# Patient Record
Sex: FEMALE | Race: WHITE | Employment: OTHER | URBAN - METROPOLITAN AREA
[De-identification: names, ages, dates, MRNs, and addresses within clinical notes are randomized per-mention and may not be internally consistent; named-entity substitution may affect disease eponyms.]

---

## 2017-05-17 NOTE — TELEPHONE ENCOUNTER
Refill is per verbal order of Dr. Virginia Bradley.    Requested Prescriptions     Pending Prescriptions Disp Refills    fenofibric acid (TRILIPIX) 135 mg capsule 30 Cap 1     Sig: Take 1 Cap by mouth daily.

## 2017-05-18 RX ORDER — FENOFIBRIC ACID 135 MG/1
135 CAPSULE, DELAYED RELEASE ORAL DAILY
Qty: 30 CAP | Refills: 1 | Status: SHIPPED | OUTPATIENT
Start: 2017-05-18 | End: 2017-07-03 | Stop reason: SDUPTHER

## 2017-05-23 ENCOUNTER — TELEPHONE (OUTPATIENT)
Dept: CARDIOLOGY CLINIC | Age: 77
End: 2017-05-23

## 2017-05-23 DIAGNOSIS — E78.00 ELEVATED CHOLESTEROL: Primary | ICD-10-CM

## 2017-06-28 ENCOUNTER — OFFICE VISIT (OUTPATIENT)
Dept: CARDIOLOGY CLINIC | Age: 77
End: 2017-06-28

## 2017-06-28 VITALS
DIASTOLIC BLOOD PRESSURE: 50 MMHG | SYSTOLIC BLOOD PRESSURE: 140 MMHG | BODY MASS INDEX: 30.39 KG/M2 | HEIGHT: 64 IN | HEART RATE: 76 BPM | WEIGHT: 178 LBS

## 2017-06-28 DIAGNOSIS — E78.00 HYPERCHOLESTEROLEMIA: ICD-10-CM

## 2017-06-28 DIAGNOSIS — E78.00 ELEVATED CHOLESTEROL: Primary | ICD-10-CM

## 2017-06-28 DIAGNOSIS — I48.0 PAROXYSMAL ATRIAL FIBRILLATION (HCC): ICD-10-CM

## 2017-06-28 DIAGNOSIS — I10 ESSENTIAL HYPERTENSION: ICD-10-CM

## 2017-06-28 RX ORDER — CARBIDOPA AND LEVODOPA 25; 100 MG/1; MG/1
1 TABLET, EXTENDED RELEASE ORAL 3 TIMES DAILY
COMMUNITY

## 2017-06-28 RX ORDER — DOCUSATE SODIUM 100 MG/1
100 CAPSULE, LIQUID FILLED ORAL 2 TIMES DAILY
COMMUNITY

## 2017-06-28 NOTE — PROGRESS NOTES
Visit Vitals    /50    Pulse 76    Ht 5' 4\" (1.626 m)    Wt 178 lb (80.7 kg)    BMI 30.55 kg/m2     Medication changes for this OV VO per Dr Rogelio Dobbs

## 2017-06-28 NOTE — MR AVS SNAPSHOT
Visit Information Date & Time Provider Department Dept. Phone Encounter #  
 6/28/2017  1:20 PM Jovani Gupta MD [facility] Jazzmine Singh [phone] [id number] Follow-up Instructions Return in about 6 months (around 12/28/2017). Your Appointments 12/22/2017  1:00 PM  
ESTABLISHED PATIENT with Jovani Gupta MD  
[facility] (NICKIE SCHEDULING) Appt Note: 6 mo [address] [address] [address]  
[phone]  
  
   
 [address] [address] [address] Upcoming Health Maintenance Date Due DTaP/Tdap/Td series (1 - Tdap) 8/13/1961 ZOSTER VACCINE AGE 60> 8/13/2000 GLAUCOMA SCREENING Q2Y 8/13/2005 OSTEOPOROSIS SCREENING (DEXA) 8/13/2005 Pneumococcal 65+ Low/Medium Risk (1 of 2 - PCV13) 8/13/2005 MEDICARE YEARLY EXAM 8/13/2005 INFLUENZA AGE 9 TO ADULT 8/1/2017 Allergies as of 6/28/2017  Review Complete On: 6/28/2017 By: oJvani Gupta MD  
  
 Severity Noted Reaction Type Reactions Demerol [Meperidine]  11/09/2010    Nausea and Vomiting Heparinoids  03/19/2014    Other (comments) Has a +Hit Ab. Ivp Dye [Fd And C Blue No.1]  11/09/2010    Rash Percocet [Oxycodone-acetaminophen]  03/11/2014    Nausea Only Sulfa (Sulfonamide Antibiotics)  11/09/2010    Rash Current Immunizations  Never Reviewed No immunizations on file. Not reviewed this visit You Were Diagnosed With   
  
 Codes Comments Elevated cholesterol    -  Primary ICD-10-CM: E78.00 ICD-9-CM: 272.0 Paroxysmal atrial fibrillation (HCC)     ICD-10-CM: I48.0 ICD-9-CM: 427.31 Hypercholesterolemia     ICD-10-CM: E78.00 ICD-9-CM: 272.0 Essential hypertension     ICD-10-CM: I10 
ICD-9-CM: 401.9 Vitals BP Pulse Height(growth percentile) Weight(growth percentile) BMI OB Status 140/50 76 5' 4\" (1.626 m) 178 lb (80.7 kg) 30.55 kg/m2 Postmenopausal  
 Smoking Status Never Smoker Vitals History BMI and BSA Data Body Mass Index Body Surface Area 30.55 kg/m 2 1.91 m 2 Preferred Pharmacy Pharmacy Name Phone [facility] [address] [facility],  Kira Lemus [address] [place] [address] [phone] Your Updated Medication List  
  
   
This list is accurate as of: 6/28/17  1:43 PM.  Always use your most recent med list.  
  
  
  
  
 aspirin delayed-release 81 mg tablet Take  by mouth daily. baclofen 10 mg tablet Commonly known as:  LIORESAL Take 1 Tab by mouth every eight (8) hours as needed (spasms). COLACE 100 mg capsule Generic drug:  docusate sodium Take 100 mg by mouth two (2) times a day. DULoxetine 60 mg capsule Commonly known as:  CYMBALTA Take 60 mg by mouth daily. fenofibric acid 135 mg capsule Commonly known as:  TRILIPIX Take 1 Cap by mouth daily. furosemide 40 mg tablet Commonly known as:  LASIX Takes 1/2 tablet twice daily  
  
 gabapentin 300 mg capsule Commonly known as:  NEURONTIN Take 300 mg by mouth two (2) times daily as needed. HYDROcodone-acetaminophen 5-325 mg per tablet Commonly known as:  Milinda Copier Take 0.5-1 Tabs by mouth every six (6) hours as needed for Pain.  
  
 metFORMIN 500 mg tablet Commonly known as:  GLUCOPHAGE Take  by mouth two (2) times daily (with meals). NUPLAZID 17 mg Tab Generic drug:  pimavanserin Take  by mouth. oxybutynin 5 mg tablet Commonly known as:  MFADRIES Take 5 mg by mouth daily as needed. PriLOSEC 40 mg capsule Generic drug:  omeprazole Take 40 mg by mouth daily. PROBIOTIC PO Take  by mouth three (3) times daily as needed. Senna 8.6 mg Cap Generic drug:  sennosides Take  by mouth two (2) times daily as needed. SINEMET CR  mg per tablet Generic drug:  carbidopa-levodopa CR Take 1 Tab by mouth two (2) times a day. TOPROL XL 25 mg XL tablet Generic drug:  metoprolol succinate Take  by mouth daily. Follow-up Instructions Return in about 6 months (around 12/28/2017). Introducing [facility] SERVICES! Dear Shilpi Vaca: Thank you for requesting a ICVRx account. Our records indicate that you already have an active ICVRx account. You can access your account anytime at [email or web address] Did you know that you can access your hospital and ER discharge instructions at any time in ICVRx? You can also review all of your test results from your hospital stay or ER visit. Additional Information If you have questions, please visit the Frequently Asked Questions section of the ICVRx website at [email or web address] Remember, ICVRx is NOT to be used for urgent needs. For medical emergencies, dial 911. Now available from your iPhone and Android! Please provide this summary of care documentation to your next provider. Your primary care clinician is listed as Abi Coy III. If you have any questions after today's visit, please call [phone].

## 2017-06-28 NOTE — PROGRESS NOTES
LAST OFFICE VISIT : 12/27/2016        ICD-10-CM ICD-9-CM   1. Elevated cholesterol E78.00 272.0   2. Paroxysmal atrial fibrillation (HCC) I48.0 427.31   3. Hypercholesterolemia E78.00 272.0   4. Essential hypertension I10 401.9            Steph Dc is a 68 y.o. female with HTN, dyslipidemia referred for routine 6 month follow up. Cardiac risk factors: dyslipidemia, hypertension, post-menopausal  I have personally obtained the history from the patient. [facility] [place]      Mrs. Aiken is doing well with no interval issues. She has chronic BARRIOS with moderate activity. Recently suffered from pneumonia few months ago. Does not report aspiration. Daughter notes that she \"chokes\" on her food. Follows pulmonology for this. She leads a fairly sedentary lifestyle, but pursues in home therapy with assistance of an aid. She ambulates with a wheelchair. The patient denies chest pain/ shortness of breath, orthopnea, PND, LE edema, palpitations, syncope, presyncope or fatigue. Of note, she is accompanied by daughter and .       ACTIVE PROBLEM LIST     Patient Active Problem List    Diagnosis Date Noted    Paroxysmal atrial fibrillation ([facility] [address] [facility] 12/09/2015    Bilateral hip pain 03/21/2014    Arm swelling 03/21/2014    Oliguria 03/21/2014    Fever, unspecified 03/21/2014    Thrombocytopenia, unspecified ([facility] 03/21/2014    HIT (heparin-induced thrombocytopenia) ([facility] 03/21/2014    HTN (hypertension) 17/76/6302    Complicated UTI (urinary tract infection) 03/21/2014    Depression 03/21/2014    Debility 93/20/2582    Metabolic encephalopathy 07/83/8909    Abscess of suprapubic region 03/14/2014    Hip pain 03/11/2014    Unspecified essential hypertension 03/11/2014    Chest discomfort     Hypercholesterolemia            PAST MEDICAL HISTORY     Past Medical History:   Diagnosis Date    Blockage of coronary artery of heart (HCC)     Breast cancer ([facility]     Chest discomfort     Fetal alcohol syndrome     Hernia     Hip pain, chronic     Hx of nephrostomy     Hypercholesterolemia     Hypertension            PAST SURGICAL HISTORY     Past Surgical History:   Procedure Laterality Date    HX CHOLECYSTECTOMY      HX ORTHOPAEDIC Right     BKA    VASCULAR SURGERY PROCEDURE UNLIST Right     right leg vascular procedure          ALLERGIES     Allergies   Allergen Reactions    Demerol [Meperidine] Nausea and Vomiting    Heparinoids Other (comments)     Has a +Hit Ab.  Ivp Dye [Fd And C Blue No.1] Rash    Percocet [Oxycodone-Acetaminophen] Nausea Only    Sulfa (Sulfonamide Antibiotics) Rash          FAMILY HISTORY     No family history on file. negative for cardiac disease       SOCIAL HISTORY     Social History     Social History    Marital status:      Spouse name: N/A    Number of children: N/A    Years of education: N/A     Social History Main Topics    Smoking status: Never Smoker    Smokeless tobacco: Never Used    Alcohol use No    Drug use: None    Sexual activity: Not Asked     Other Topics Concern    None     Social History Narrative         MEDICATIONS     Current Outpatient Prescriptions   Medication Sig    docusate sodium (COLACE) 100 mg capsule Take 100 mg by mouth two (2) times a day.  pimavanserin (NUPLAZID) 17 mg tab Take  by mouth.  carbidopa-levodopa CR (SINEMET CR)  mg per tablet Take 1 Tab by mouth two (2) times a day.  fenofibric acid (TRILIPIX) 135 mg capsule Take 1 Cap by mouth daily.  furosemide (LASIX) 40 mg tablet Takes 1/2 tablet twice daily    sennosides (SENNA) 8.6 mg cap Take  by mouth two (2) times daily as needed.  LACTOBACILLUS ACIDOPHILUS (PROBIOTIC PO) Take  by mouth three (3) times daily as needed.  omeprazole (PRILOSEC) 40 mg capsule Take 40 mg by mouth daily.  aspirin delayed-release 81 mg tablet Take  by mouth daily.     metoprolol succinate (TOPROL XL) 25 mg XL tablet Take  by mouth daily.  metFORMIN (GLUCOPHAGE) 500 mg tablet Take  by mouth two (2) times daily (with meals).  baclofen (LIORESAL) 10 mg tablet Take 1 Tab by mouth every eight (8) hours as needed (spasms).  HYDROcodone-acetaminophen (NORCO) 5-325 mg per tablet Take 0.5-1 Tabs by mouth every six (6) hours as needed for Pain.  gabapentin (NEURONTIN) 300 mg capsule Take 300 mg by mouth two (2) times daily as needed.  oxybutynin (DITROPAN) 5 mg tablet Take 5 mg by mouth daily as needed.  DULoxetine (CYMBALTA) 60 mg capsule Take 60 mg by mouth daily. No current facility-administered medications for this visit. I have reviewed the nurses notes, vitals, problem list, allergy list, medical history, family, social history and medications. REVIEW OF SYMPTOMS      General: Pt denies excessive weight gain or loss. Pt is able to conduct ADL's  HEENT: Denies blurred vision, headaches, hearing loss, epistaxis and difficulty swallowing. Respiratory: Denies cough, congestion, shortness of breath, BARRIOS, wheezing or stridor. Cardiovascular: Denies precordial pain, palpitations, edema or PND  Gastrointestinal: Denies poor appetite, indigestion, abdominal pain or blood in stool  Genitourinary: Denies hematuria, dysuria, increased urinary frequency  Musculoskeletal: Denies swelling from muscles or joints. Positive for joint pain. Neurologic: Denies tremor, paresthesias, headache, or sensory motor disturbance  Psychiatric: Denies confusion, insomnia, depression  Integumentray: Denies rash, itching or ulcers. Hematologic: Denies easy bruising, bleeding     PHYSICAL EXAMINATION      Vitals:    06/28/17 1321   BP: 140/50   Pulse: 76   Weight: 178 lb (80.7 kg)   Height: 5' 4\" (1.626 m)     General: Well developed, in no acute distress. HEENT: No jaundice, oral mucosa moist, no oral ulcers  Neck: Supple, no stiffness, no lymphadenopathy, supple  Heart:  Normal S1/S2 negative S3 or S4.  Regular, no murmur, gallop or rub, no jugular venous distention  Respiratory: Clear bilaterally x 4, no wheezing or rales  Extremities:  Right leg amputation  Musculoskeletal: No clubbing, no deformities  Neuro: A&Ox3, speech clear, gait stable, cooperative, no focal neurologic deficits  Skin: Skin color is normal. No rashes or lesions. Non diaphoretic, moist.       DIAGNOSTIC DATA     1. Lipids  6/3/16 - , HDL 29, LDL 85,   5/9/17- , HDL 29, LDL 52,     2. Echocardiogram   5/12/09 - EF 55-60%, Mild TR, small pericardial effusion, RVSP 34 mmHg  1/28/13 - LVEF 60%, no WMA, grade 2 DD, mild LAE, MAC, mild MR, mild to moderate TR, RA 10 mmHg, PA 44mmHg  10/14 - Normal Echo    3. Myocardial perfusion study   5/2/09 - Normal perfusion, EF 77%  10/10/14 - Normal stress test at [facility]    4. Carotid Duplex  7/21/11 - 10-49% bilaterally  6/18/81 - DARYL 18-22%, LICA 28-61%  87/7/02 - DARYL 51-94%, LICA 21-71%  6/85/17 - DARYL 56-49%, LICA 68-40%  88/52/72-BZHD 44-46%, LICA 87-41%           LABORATORY DATA            Lab Results   Component Value Date/Time    WBC 3.7 03/20/2014 04:50 AM    Hemoglobin (POC) 11.9 03/11/2014 09:03 PM    HGB 9.0 03/20/2014 04:50 AM    Hematocrit (POC) 35 03/11/2014 09:03 PM    HCT 28.4 03/20/2014 04:50 AM    PLATELET 292 85/91/0982 04:50 AM    MCV 91.3 03/20/2014 04:50 AM      Lab Results   Component Value Date/Time    Sodium 140 03/20/2014 04:50 AM    Potassium 3.8 03/20/2014 04:50 AM    Chloride 101 03/20/2014 04:50 AM    CO2 27 03/20/2014 04:50 AM    Anion gap 12 03/20/2014 04:50 AM    Glucose 115 03/20/2014 04:50 AM    BUN 16 03/20/2014 04:50 AM    Creatinine 0.73 03/20/2014 04:50 AM    BUN/Creatinine ratio 22 03/20/2014 04:50 AM    GFR est AA >60 03/20/2014 04:50 AM    GFR est non-AA >60 03/20/2014 04:50 AM    Calcium 8.0 03/20/2014 04:50 AM    Bilirubin, total 0.6 05/09/2017 10:38 AM    AST (SGOT) 16 05/09/2017 10:38 AM    Alk.  phosphatase 38 05/09/2017 10:38 AM    Protein, total 6.8 05/09/2017 10:38 AM    Albumin 3.7 05/09/2017 10:38 AM    ALT (SGPT) 8 05/09/2017 10:38 AM           ASSESSMENT/RECOMMENDATIONS:.      1. HTN  -BP is under good control on current medical regimen. No adjustments to antihypertensives. 2. Dyslipidemia  -LDL is at goal however HDL is low and most likely related to inactivity    3. Right carotid bruit  -not audible, and have been stable  -will recheck in 6 months    4. PAF  - No recurrent AF spells. No complaints of irregularity in rhythm  -Family understands the risk of not being anticoagualted. -Continue with ASA daily. 5. Return in 6 months or PRN    Orders Placed This Encounter    docusate sodium (COLACE) 100 mg capsule     Sig: Take 100 mg by mouth two (2) times a day.  pimavanserin (NUPLAZID) 17 mg tab     Sig: Take  by mouth.  carbidopa-levodopa CR (SINEMET CR)  mg per tablet     Sig: Take 1 Tab by mouth two (2) times a day. Follow-up Disposition:  Return in about 6 months (around 12/28/2017). I have discussed the diagnosis with  Mariah Hightower and the intended plan as seen in the above orders. Questions were answered concerning future plans. I have discussed medication side effects and warnings with the patient as well. Thank you,  Reji Purcell MD for involving me in the care of  Mariah Hightower. Please do not hesitate to contact me for further questions/concerns. Written by Momo Gleason, as dictated by Isamar Rasmussen MD.    Darrell Pemberton MD, [facility] [address]  [address]      [facility], [address] [place] [address] [address]      [phone] / [phone] Fax

## 2017-09-11 ENCOUNTER — TELEPHONE (OUTPATIENT)
Dept: CARDIOLOGY CLINIC | Age: 77
End: 2017-09-11

## 2017-09-11 NOTE — TELEPHONE ENCOUNTER
Patient's  states that the patient was recently in [facility] and would like to discuss her medication changes. Can be reached at [phone]. Thanks!

## 2017-09-12 NOTE — TELEPHONE ENCOUNTER
Need to pull records from [address] [place] [address]. - Stroke. Need to find out if pt can restart blood thinner.

## 2017-09-21 NOTE — TELEPHONE ENCOUNTER
[address] [place] [address] stay for you to review. Pt  wants to know if she can restart a blood thinner due to her having a poss. Stroke. Had stopped previously due to seeing blood in her urostomy bag per . Xarelto was rx prior.

## 2017-09-22 NOTE — TELEPHONE ENCOUNTER
Lft msg for pt . Dr. Fiona Vance wants him to check with urologist / neuro before going back on Xarelto.  He is fine with it cardiac wise but want to verify with other specialist.

## 2017-09-22 NOTE — TELEPHONE ENCOUNTER
Patient Acosta Dodd MD   You 23 hours ago (4:12 PM)                 pleawse ask them to ask neurologist if ok.  The one they saw at [place] [id number] (Routing comment)

## 2017-12-14 ENCOUNTER — HOSPITAL ENCOUNTER (OUTPATIENT)
Dept: LAB | Age: 77
Discharge: HOME | End: 2017-12-14
Payer: MEDICARE

## 2017-12-14 PROCEDURE — 80061 LIPID PANEL: CPT

## 2017-12-14 PROCEDURE — 36415 COLL VENOUS BLD VENIPUNCTURE: CPT

## 2017-12-15 LAB
CHOLEST SERPL-MCNC: 152 MG/DL (ref 100–199)
HDLC SERPL-MCNC: 36 MG/DL
INTERPRETATION, 910389: NORMAL
LDLC SERPL CALC-MCNC: 77 MG/DL (ref 0–99)
TRIGL SERPL-MCNC: 196 MG/DL (ref 0–149)
VLDLC SERPL CALC-MCNC: 39 MG/DL (ref 5–40)

## 2017-12-18 DIAGNOSIS — E78.00 HYPERCHOLESTEREMIA: Primary | ICD-10-CM

## 2017-12-22 ENCOUNTER — OFFICE VISIT (OUTPATIENT)
Dept: CARDIOLOGY CLINIC | Age: 77
End: 2017-12-22

## 2017-12-22 VITALS — HEART RATE: 68 BPM | DIASTOLIC BLOOD PRESSURE: 50 MMHG | SYSTOLIC BLOOD PRESSURE: 130 MMHG | OXYGEN SATURATION: 97 %

## 2017-12-22 DIAGNOSIS — I48.0 PAROXYSMAL ATRIAL FIBRILLATION (HCC): ICD-10-CM

## 2017-12-22 DIAGNOSIS — I10 ESSENTIAL HYPERTENSION: ICD-10-CM

## 2017-12-22 DIAGNOSIS — E78.00 ELEVATED CHOLESTEROL: Primary | ICD-10-CM

## 2017-12-22 PROBLEM — F33.9 RECURRENT DEPRESSION (HCC): Status: ACTIVE | Noted: 2017-12-22

## 2017-12-22 RX ORDER — RIVASTIGMINE 4.6 MG/24H
1 PATCH, EXTENDED RELEASE TRANSDERMAL DAILY
COMMUNITY

## 2017-12-22 NOTE — MR AVS SNAPSHOT
Visit Information Date & Time Provider Department Dept. Phone Encounter #  
 12/22/2017  1:00 PM Juana Barraza MD [facility] Dea Ruby [phone] [id number] Upcoming Health Maintenance Date Due DTaP/Tdap/Td series (1 - Tdap) 8/13/1961 ZOSTER VACCINE AGE 60> 6/13/2000 GLAUCOMA SCREENING Q2Y 8/13/2005 OSTEOPOROSIS SCREENING (DEXA) 8/13/2005 Pneumococcal 65+ Low/Medium Risk (1 of 2 - PCV13) 8/13/2005 MEDICARE YEARLY EXAM 8/13/2005 Influenza Age 5 to Adult 8/1/2017 Allergies as of 12/22/2017  Review Complete On: 12/22/2017 By: Juana Barraza MD  
  
 Severity Noted Reaction Type Reactions Demerol [Meperidine]  11/09/2010    Nausea and Vomiting Heparinoids  03/19/2014    Other (comments) Has a +Hit Ab. Ivp Dye [Fd And C Blue No.1]  11/09/2010    Rash Percocet [Oxycodone-acetaminophen]  03/11/2014    Nausea Only Sulfa (Sulfonamide Antibiotics)  11/09/2010    Rash Current Immunizations  Never Reviewed No immunizations on file. Not reviewed this visit Vitals BP Pulse SpO2 OB Status Smoking Status 130/50 (BP 1 Location: Right arm, BP Patient Position: Sitting) 68 97% Postmenopausal Never Smoker Vitals History Preferred Pharmacy Pharmacy Name Phone [facility] [address] [place] [address] [phone] Your Updated Medication List  
  
   
This list is accurate as of: 12/22/17  1:19 PM.  Always use your most recent med list.  
  
  
  
  
 aspirin delayed-release 81 mg tablet Take  by mouth daily. baclofen 10 mg tablet Commonly known as:  LIORESAL Take 1 Tab by mouth every eight (8) hours as needed (spasms). COLACE 100 mg capsule Generic drug:  docusate sodium Take 100 mg by mouth two (2) times a day. DULoxetine 60 mg capsule Commonly known as:  CYMBALTA Take 60 mg by mouth daily. fenofibric acid 135 mg capsule Commonly known as:  TRILIPIX Take 1 Cap by mouth daily. furosemide 40 mg tablet Commonly known as:  LASIX Takes 1/2 tablet twice daily  
  
 gabapentin 300 mg capsule Commonly known as:  NEURONTIN Take 300 mg by mouth two (2) times daily as needed. HYDROcodone-acetaminophen 5-325 mg per tablet Commonly known as:  Barnet Cuff Take 0.5-1 Tabs by mouth every six (6) hours as needed for Pain.  
  
 metFORMIN 500 mg tablet Commonly known as:  GLUCOPHAGE Take  by mouth two (2) times daily (with meals). NUPLAZID 17 mg Tab Generic drug:  pimavanserin Take 17 mg by mouth nightly. oxybutynin 5 mg tablet Commonly known as:  DLGWSHQB Take 5 mg by mouth daily as needed. PriLOSEC 40 mg capsule Generic drug:  omeprazole Take 40 mg by mouth daily. PROBIOTIC PO Take  by mouth three (3) times daily as needed. rivastigmine 4.6 mg/24 hr patch Commonly known as:  EXELON  
1 Patch by TransDERmal route daily. Senna 8.6 mg Cap Generic drug:  sennosides Take  by mouth two (2) times daily as needed. SINEMET CR  mg per tablet Generic drug:  carbidopa-levodopa ER Take 1 Tab by mouth three (3) times daily. TOPROL XL 25 mg XL tablet Generic drug:  metoprolol succinate Take  by mouth daily. Introducing [facility]! Dear Tc Florez: Thank you for requesting a MePlease account. Our records indicate that you already have an active MePlease account. You can access your account anytime at [email or web address] Did you know that you can access your hospital and ER discharge instructions at any time in MePlease? You can also review all of your test results from your hospital stay or ER visit. Additional Information If you have questions, please visit the Frequently Asked Questions section of the MePlease website at [email or web address] Remember, MyChart is NOT to be used for urgent needs. For medical emergencies, dial 911. Now available from your iPhone and Android! Please provide this summary of care documentation to your next provider. Your primary care clinician is listed as Comfort Christian III. If you have any questions after today's visit, please call [phone].

## 2017-12-22 NOTE — PROGRESS NOTES
Pt has no complaints/no cardiac concerns    Visit Vitals    /50 (BP 1 Location: Right arm, BP Patient Position: Sitting)    Pulse 68    SpO2 97%     Pt cannot be weighed

## 2017-12-22 NOTE — PROGRESS NOTES
LAST OFFICE VISIT : 12/27/2016      No diagnosis found. Arianna Lauren is a 68 y.o. female with HTN, dyslipidemia referred for routine 6 month follow up. Cardiac risk factors: dyslipidemia, hypertension, post-menopausal  I have personally obtained the history from the patient. Annamarie Aiken is doing well with no interval issues. She appears that her parkinsons is worse. Overall she is ocmopensated. She leads a very sedentary lifestyle and is limited. I have told the family she should enjoy life and eat what she wants. No irregularity in heart rhtyhm. Of note,today she is accompanied by daughter and .       ACTIVE PROBLEM LIST     Patient Active Problem List    Diagnosis Date Noted    Recurrent depression ([facility] [address] 12/22/2017    Paroxysmal atrial fibrillation ([facility] [address] 12/09/2015    Bilateral hip pain 03/21/2014    Arm swelling 03/21/2014    Oliguria 03/21/2014    Fever, unspecified 03/21/2014    Thrombocytopenia, unspecified 03/21/2014    HIT (heparin-induced thrombocytopenia) ([facility] [address] 03/21/2014    HTN (hypertension) 66/73/8530    Complicated UTI (urinary tract infection) 03/21/2014    Depression 03/21/2014    Debility 90/19/2383    Metabolic encephalopathy 10/86/4751    Abscess of suprapubic region 03/14/2014    Hip pain 03/11/2014    Unspecified essential hypertension 03/11/2014    Chest discomfort     Hypercholesterolemia            PAST MEDICAL HISTORY     Past Medical History:   Diagnosis Date    Blockage of coronary artery of heart (HCC)     Breast cancer (HCC)     Chest discomfort     Fetal alcohol syndrome     Hernia     Hip pain, chronic     Hx of nephrostomy     Hypercholesterolemia     Hypertension            PAST SURGICAL HISTORY     Past Surgical History:   Procedure Laterality Date    HX CHOLECYSTECTOMY      HX ORTHOPAEDIC Right     BKA    VASCULAR SURGERY PROCEDURE UNLIST Right     right leg vascular procedure ALLERGIES     Allergies   Allergen Reactions    Demerol [Meperidine] Nausea and Vomiting    Heparinoids Other (comments)     Has a +Hit Ab.  Ivp Dye [Fd And C Blue No.1] Rash    Percocet [Oxycodone-Acetaminophen] Nausea Only    Sulfa (Sulfonamide Antibiotics) Rash          FAMILY HISTORY     No family history on file. negative for cardiac disease       SOCIAL HISTORY     Social History     Social History    Marital status:      Spouse name: N/A    Number of children: N/A    Years of education: N/A     Social History Main Topics    Smoking status: Never Smoker    Smokeless tobacco: Never Used    Alcohol use No    Drug use: None    Sexual activity: Not Asked     Other Topics Concern    None     Social History Narrative         MEDICATIONS     Current Outpatient Prescriptions   Medication Sig    rivastigmine (EXELON) 4.6 mg/24 hr patch 1 Patch by TransDERmal route daily.  fenofibric acid (TRILIPIX) 135 mg capsule Take 1 Cap by mouth daily.  docusate sodium (COLACE) 100 mg capsule Take 100 mg by mouth two (2) times a day.  pimavanserin (NUPLAZID) 17 mg tab Take 17 mg by mouth nightly.  carbidopa-levodopa CR (SINEMET CR)  mg per tablet Take 1 Tab by mouth three (3) times daily.  furosemide (LASIX) 40 mg tablet Takes 1/2 tablet twice daily    sennosides (SENNA) 8.6 mg cap Take  by mouth two (2) times daily as needed.  LACTOBACILLUS ACIDOPHILUS (PROBIOTIC PO) Take  by mouth three (3) times daily as needed.  omeprazole (PRILOSEC) 40 mg capsule Take 40 mg by mouth daily.  aspirin delayed-release 81 mg tablet Take  by mouth daily.  metoprolol succinate (TOPROL XL) 25 mg XL tablet Take  by mouth daily.  metFORMIN (GLUCOPHAGE) 500 mg tablet Take  by mouth two (2) times daily (with meals).  baclofen (LIORESAL) 10 mg tablet Take 1 Tab by mouth every eight (8) hours as needed (spasms).     HYDROcodone-acetaminophen (NORCO) 5-325 mg per tablet Take 0.5-1 Tabs by mouth every six (6) hours as needed for Pain.  gabapentin (NEURONTIN) 300 mg capsule Take 300 mg by mouth two (2) times daily as needed.  oxybutynin (DITROPAN) 5 mg tablet Take 5 mg by mouth daily as needed.  DULoxetine (CYMBALTA) 60 mg capsule Take 60 mg by mouth daily. No current facility-administered medications for this visit. I have reviewed the nurses notes, vitals, problem list, allergy list, medical history, family, social history and medications. REVIEW OF SYMPTOMS      General: Pt denies excessive weight gain or loss. Pt is able to conduct ADL's  HEENT: Denies blurred vision, headaches, hearing loss, epistaxis and difficulty swallowing. Respiratory: Denies cough, congestion, shortness of breath, BARRIOS, wheezing or stridor. Cardiovascular: Denies precordial pain, palpitations, edema or PND  Gastrointestinal: Denies poor appetite, indigestion, abdominal pain or blood in stool  Genitourinary: Denies hematuria, dysuria, increased urinary frequency  Musculoskeletal: Denies swelling from muscles or joints. Positive for joint pain. Neurologic: Denies tremor, paresthesias, headache, or sensory motor disturbance  Psychiatric: Denies confusion, insomnia, depression  Integumentray: Denies rash, itching or ulcers. Hematologic: Denies easy bruising, bleeding     PHYSICAL EXAMINATION      Vitals:    12/22/17 1252   BP: 130/50   Pulse: 68   SpO2: 97%     General: Well developed, in no acute distress. In a wheelchair slow in conversation  Neck: Supple, no stiffness, no lymphadenopathy, supple  Heart:  RRR  Respiratory: Clear bilaterally x 4, no wheezing or rales  Extremities:  Right leg amputation  Musculoskeletal: No clubbing, no deformities  Neuro: A&Ox3, speech clear, gait stable, cooperative, no focal neurologic deficits  Skin: Skin color is slow and soft. No rashes or lesions. Non diaphoretic, moist.       DIAGNOSTIC DATA     1.  Lipids  6/3/16 - , HDL 29, LDL 85,   5/9/17- , HDL 29, LDL 52,   12/14/17- , HDL 36, LDL 77,     2. Echocardiogram   5/12/09 - EF 55-60%, Mild TR, small pericardial effusion, RVSP 34 mmHg  1/28/13 - LVEF 60%, no WMA, grade 2 DD, mild LAE, MAC, mild MR, mild to moderate TR, RA 10 mmHg, PA 44mmHg  10/14 - Normal Echo  9/5/17- EF 50-55%, CHELSEA, MR/TR mod    3. Myocardial perfusion study   5/2/09 - Normal perfusion, EF 77%  10/10/14 - Normal stress test at [facility]    4. Carotid Duplex  7/21/11 - 10-49% bilaterally  2/82/15 - DARYL 97-26%, LICA 84-91%  81/2/43 - DARYL 65-46%, LICA 74-39%  8/34/65 - DARYL 76-48%, LICA 03-37%  20/88/22-VLGU 25-65%, LICA 78-61%  9/0/04- 50-79% [id number]         LABORATORY DATA            Lab Results   Component Value Date/Time    WBC 3.7 03/20/2014 04:50 AM    Hemoglobin (POC) 11.9 03/11/2014 09:03 PM    HGB 9.0 03/20/2014 04:50 AM    Hematocrit (POC) 35 03/11/2014 09:03 PM    HCT 28.4 03/20/2014 04:50 AM    PLATELET 900 63/10/8640 04:50 AM    MCV 91.3 03/20/2014 04:50 AM      Lab Results   Component Value Date/Time    Sodium 140 03/20/2014 04:50 AM    Potassium 3.8 03/20/2014 04:50 AM    Chloride 101 03/20/2014 04:50 AM    CO2 27 03/20/2014 04:50 AM    Anion gap 12 03/20/2014 04:50 AM    Glucose 115 03/20/2014 04:50 AM    BUN 16 03/20/2014 04:50 AM    Creatinine 0.73 03/20/2014 04:50 AM    BUN/Creatinine ratio 22 03/20/2014 04:50 AM    GFR est AA >60 03/20/2014 04:50 AM    GFR est non-AA >60 03/20/2014 04:50 AM    Calcium 8.0 03/20/2014 04:50 AM    Bilirubin, total 0.6 05/09/2017 10:38 AM    AST (SGOT) 16 05/09/2017 10:38 AM    Alk. phosphatase 38 05/09/2017 10:38 AM    Protein, total 6.8 05/09/2017 10:38 AM    Albumin 3.7 05/09/2017 10:38 AM    ALT (SGPT) 8 05/09/2017 10:38 AM           ASSESSMENT/RECOMMENDATIONS:.      1. HTN  -BP is under good control on current medical regimen. No adjustments to antihypertensives.      2. Dyslipidemia  -LDL is at goal h and HDL is chronically low but somewhat better on trilipex. 3. Right carotid bruit  -bilateral carotid ultrasounds with progression of disease. Unlikely CEA will be done with her medical issues. Will follow. 4. PAF  - No recurrent AF spells. No complaints of irregularity in rhythm  -Family understands the risk of not being anticoagulated. -Continue with ASA daily. 5. Return in 6 months or PRN    Orders Placed This Encounter    rivastigmine (EXELON) 4.6 mg/24 hr patch     Si Patch by TransDERmal route daily. Follow-up Disposition: Not on File      I have discussed the diagnosis with  Kaya Hess and the intended plan as seen in the above orders. Questions were answered concerning future plans. I have discussed medication side effects and warnings with the patient as well. Thank you,  Mickey Maya MD for involving me in the care of  Kaya Hess. Please do not hesitate to contact me for further questions/concerns. Written by Momo Mojica, as dictated by Rika Tellez MD.    Marcella Pemberton MD, [address] [place] [address] Rd[address]      [address] [address] [address]      [phone] / [phone] Fax

## 2018-06-02 LAB
ALBUMIN SERPL-MCNC: 3.8 G/DL (ref 3.5–4.8)
ALP SERPL-CCNC: 44 IU/L (ref 39–117)
ALT SERPL-CCNC: <5 IU/L (ref 0–32)
AST SERPL-CCNC: 22 IU/L (ref 0–40)
BILIRUB DIRECT SERPL-MCNC: 0.49 MG/DL (ref 0–0.4)
BILIRUB SERPL-MCNC: 0.9 MG/DL (ref 0–1.2)
CHOLEST SERPL-MCNC: 145 MG/DL (ref 100–199)
HDLC SERPL-MCNC: 29 MG/DL
LDLC SERPL CALC-MCNC: 74 MG/DL (ref 0–99)
PROT SERPL-MCNC: 7 G/DL (ref 6–8.5)
TRIGL SERPL-MCNC: 210 MG/DL (ref 0–149)
VLDLC SERPL CALC-MCNC: 42 MG/DL (ref 5–40)

## 2018-06-08 ENCOUNTER — OFFICE VISIT (OUTPATIENT)
Dept: CARDIOLOGY CLINIC | Age: 78
End: 2018-06-08

## 2018-06-08 VITALS
RESPIRATION RATE: 18 BRPM | HEIGHT: 64 IN | DIASTOLIC BLOOD PRESSURE: 50 MMHG | SYSTOLIC BLOOD PRESSURE: 126 MMHG | HEART RATE: 74 BPM | WEIGHT: 170 LBS | OXYGEN SATURATION: 98 % | BODY MASS INDEX: 29.02 KG/M2

## 2018-06-08 DIAGNOSIS — I10 ESSENTIAL HYPERTENSION: Primary | ICD-10-CM

## 2018-06-08 DIAGNOSIS — E78.00 HYPERCHOLESTEROLEMIA: ICD-10-CM

## 2018-06-08 NOTE — PROGRESS NOTES
LAST OFFICE VISIT : 12/22/2017      No diagnosis found. Jose Alvarado is a 68 y.o. female with hypertension, dyslipidemia referred for 6 month follow up. Cardiac risk factors: dyslipidemia, hypertension, post-menopausal  I have personally obtained the history from the patient. HISTORY OF PRESENTING ILLNESS     Overall the pt states she is doing well. She notes that she fell out of her bed 9 months ago. The pt is using an incentive spirometer. She chokes on her food but she is not interested in getting her esophagus stretched at this time. The pt's  states that he tries to get her to do exercises with her trapeze bar but the pt doesn't want to at this point. Her family believes she should be able to eat what she wants at this point. The pt's  states that he checks her BGL and these are usually normal and under good control. The patient denies chest pain/ shortness of breath, orthopnea, PND, LE edema, palpitations, syncope, presyncope or fatigue.          ACTIVE PROBLEM LIST     Patient Active Problem List    Diagnosis Date Noted    Recurrent depression ([facility] [address] 12/22/2017    Paroxysmal atrial fibrillation ([facility] [address] 12/09/2015    Bilateral hip pain 03/21/2014    Arm swelling 03/21/2014    Oliguria 03/21/2014    Fever, unspecified 03/21/2014    Thrombocytopenia, unspecified ([address] 03/21/2014    HIT (heparin-induced thrombocytopenia) ([facility] [address] 03/21/2014    HTN (hypertension) 36/16/1980    Complicated UTI (urinary tract infection) 03/21/2014    Depression 03/21/2014    Debility 16/48/4958    Metabolic encephalopathy 95/86/9423    Abscess of suprapubic region 03/14/2014    Hip pain 03/11/2014    Unspecified essential hypertension 03/11/2014    Chest discomfort     Hypercholesterolemia            PAST MEDICAL HISTORY     Past Medical History:   Diagnosis Date    Blockage of coronary artery of heart (HCC)     Breast cancer (HCC)     Chest discomfort     Fetal alcohol syndrome     Hernia     Hip pain, chronic     Hx of nephrostomy (HCC)     Hypercholesterolemia     Hypertension            PAST SURGICAL HISTORY     Past Surgical History:   Procedure Laterality Date    HX CHOLECYSTECTOMY      HX ORTHOPAEDIC Right     BKA    VASCULAR SURGERY PROCEDURE UNLIST Right     right leg vascular procedure          ALLERGIES     Allergies   Allergen Reactions    Demerol [Meperidine] Nausea and Vomiting    Heparinoids Other (comments)     Has a +Hit Ab.  Ivp Dye [Fd And C Blue No.1] Rash    Percocet [Oxycodone-Acetaminophen] Nausea Only    Sulfa (Sulfonamide Antibiotics) Rash          FAMILY HISTORY     No family history on file. negative for cardiac disease       SOCIAL HISTORY     Social History     Social History    Marital status:      Spouse name: N/A    Number of children: N/A    Years of education: N/A     Social History Main Topics    Smoking status: Never Smoker    Smokeless tobacco: Never Used    Alcohol use No    Drug use: Not on file    Sexual activity: Not on file     Other Topics Concern    Not on file     Social History Narrative         MEDICATIONS     Current Outpatient Prescriptions   Medication Sig    rivastigmine (EXELON) 4.6 mg/24 hr patch 1 Patch by TransDERmal route daily.  docusate sodium (COLACE) 100 mg capsule Take 100 mg by mouth two (2) times a day.  pimavanserin (NUPLAZID) 17 mg tab Take 17 mg by mouth nightly.  carbidopa-levodopa CR (SINEMET CR)  mg per tablet Take 1 Tab by mouth three (3) times daily.  furosemide (LASIX) 40 mg tablet Takes 1/2 tablet twice daily    omeprazole (PRILOSEC) 40 mg capsule Take 40 mg by mouth daily.  aspirin delayed-release 81 mg tablet Take  by mouth daily.  metoprolol succinate (TOPROL XL) 25 mg XL tablet Take  by mouth daily.  metFORMIN (GLUCOPHAGE) 500 mg tablet Take  by mouth two (2) times daily (with meals).     oxybutynin (DITROPAN) 5 mg tablet Take 5 mg by mouth daily as needed.  DULoxetine (CYMBALTA) 60 mg capsule Take 60 mg by mouth daily.  fenofibric acid (TRILIPIX) 135 mg capsule Take 1 Cap by mouth daily.  sennosides (SENNA) 8.6 mg cap Take  by mouth two (2) times daily as needed.  LACTOBACILLUS ACIDOPHILUS (PROBIOTIC PO) Take  by mouth three (3) times daily as needed.  baclofen (LIORESAL) 10 mg tablet Take 1 Tab by mouth every eight (8) hours as needed (spasms).  HYDROcodone-acetaminophen (NORCO) 5-325 mg per tablet Take 0.5-1 Tabs by mouth every six (6) hours as needed for Pain.  gabapentin (NEURONTIN) 300 mg capsule Take 300 mg by mouth two (2) times daily as needed. No current facility-administered medications for this visit. I have reviewed the nurses notes, vitals, problem list, allergy list, medical history, family, social history and medications. REVIEW OF SYMPTOMS      General: Pt denies excessive weight gain or loss. Pt is able to conduct ADL's  HEENT: Denies blurred vision, headaches, hearing loss, epistaxis and difficulty swallowing. Respiratory: Denies cough, congestion, shortness of breath, BARRIOS, wheezing or stridor. Cardiovascular: Denies precordial pain, palpitations, edema or PND  Gastrointestinal: Denies poor appetite, indigestion, abdominal pain or blood in stool  Genitourinary: Denies hematuria, dysuria, increased urinary frequency  Musculoskeletal: Denies joint pain or swelling from muscles or joints  Neurologic: Denies tremor, paresthesias, headache, or sensory motor disturbance  Psychiatric: Denies confusion, insomnia, depression  Integumentray: Denies rash, itching or ulcers. Hematologic: Denies easy bruising, bleeding     PHYSICAL EXAMINATION      Vitals:    06/08/18 1407   BP: 126/50   Pulse: 74   Resp: 18   SpO2: 98%   Weight: 170 lb (77.1 kg)   Height: 5' 4\" (1.626 m)     General: Well developed, in no acute distress. In wheel chair  HEENT: No jaundice, oral mucosa moist, no oral ulcers  Neck: Supple, no stiffness, no lymphadenopathy, supple  Heart:  Normal S1/S2 negative S3 or S4. Regular, no murmur, gallop or rub, no jugular venous distention  Respiratory: Crackles at both bases no wheezing or rales  Extremities:  No edema, normal cap refill, no cyanosis. Musculoskeletal: No clubbing, no deformities  Neuro: Alert and speech slow but clear. In wheelchair and limited movement. Skin: Skin color is normal. No rashes or lesions. Non diaphoretic, moist.       DIAGNOSTIC DATA     1. Lipids  6/3/16 - , HDL 29, LDL 85,   5/9/17- , HDL 29, LDL 52,   12/14/17- , HDL 36, LDL 77,   6/1/18 - , HDL 29, LDL 74,      2. Echocardiogram   5/12/09 - EF 55-60%, Mild TR, small pericardial effusion, RVSP 34 mmHg  1/28/13 - LVEF 60%, no WMA, grade 2 DD, mild LAE, MAC, mild MR, mild to moderate TR, RA 10 mmHg, PA 44mmHg  10/14 - Normal Echo  9/5/17- EF 50-55%, CHELSEA, MR/TR mod    3. Myocardial perfusion study   5/2/09 - Normal perfusion, EF 77%  10/10/14 - Normal stress test at [facility]    4.  Carotid Duplex  7/21/11 - 10-49% bilaterally  3/47/38 - DARYL 02-63%, LICA 50-30%  34/2/92 - DARYL 48-11%, LICA 26-61%  7/11/02 - DARYL 62-99%, LICA 29-31%  76/22/72-AMGE 17-21%, LICA 86-35%  7/7/99- 50-79% R/L         LABORATORY DATA            Lab Results   Component Value Date/Time    WBC 3.7 03/20/2014 04:50 AM    Hemoglobin (POC) 11.9 03/11/2014 09:03 PM    HGB 9.0 (L) 03/20/2014 04:50 AM    Hematocrit (POC) 35 03/11/2014 09:03 PM    HCT 28.4 (L) 03/20/2014 04:50 AM    PLATELET 226 (L) 95/48/5120 04:50 AM    MCV 91.3 03/20/2014 04:50 AM      Lab Results   Component Value Date/Time    Sodium 140 03/20/2014 04:50 AM    Potassium 3.8 03/20/2014 04:50 AM    Chloride 101 03/20/2014 04:50 AM    CO2 27 03/20/2014 04:50 AM    Anion gap 12 03/20/2014 04:50 AM    Glucose 115 (H) 03/20/2014 04:50 AM    BUN 16 03/20/2014 04:50 AM    Creatinine 0.73 03/20/2014 04:50 AM    BUN/Creatinine ratio 22 (H) 03/20/2014 04:50 AM    GFR est AA >60 03/20/2014 04:50 AM    GFR est non-AA >60 03/20/2014 04:50 AM    Calcium 8.0 (L) 03/20/2014 04:50 AM    Bilirubin, total 0.9 06/01/2018 11:33 AM    AST (SGOT) 22 06/01/2018 11:33 AM    Alk. phosphatase 44 06/01/2018 11:33 AM    Protein, total 7.0 06/01/2018 11:33 AM    Albumin 3.8 06/01/2018 11:33 AM    ALT (SGPT) <5 06/01/2018 11:33 AM           ASSESSMENT/RECOMMENDATIONS:.      1. Hypertension  - Blood pressure is under good control, no adjustments. 2. Dyslipidemia   - Lipids are at goal on current medical regimen. I have given her a lab slip to have these rechecked. 3. Right carotid bruit  - Stable I did not hear this on auscultation on exam.   4. PAF  - Is in a normal rhythm currently, I do not favor anticoagulation due to fall risk. 5. Return in 6 months or PRN. No orders of the defined types were placed in this encounter. Follow-up Disposition: Not on File      I have discussed the diagnosis with  Jayshree Martell and the intended plan as seen in the above orders. Questions were answered concerning future plans. I have discussed medication side effects and warnings with the patient as well. Thank you,  Wally Spurling, MD for involving me in the care of  Jayshree Martell. Please do not hesitate to contact me for further questions/concerns. Written by Sb Gorman, as dictated by Mukul Moya MD.     Brenda Simon MD, [facility]    Patient Care Team:  Wally Spurling, MD as PCP - General (Family Practice)  Yanni Botello MD (Neurology)    [address]      [phone] / [phone] Fax

## 2018-06-08 NOTE — MR AVS SNAPSHOT
[address] [address] 
[phone] Patient: Braeden Lyon MRN: [id number] Visit Information Date & Time Provider Department Dept. Phone Encounter #  
 6/8/2018  1:40 PM Rochelle Valenzuela MD [facility] [phone] [id number] Upcoming Health Maintenance Date Due DTaP/Tdap/Td series (1 - Tdap) 8/13/1961 ZOSTER VACCINE AGE 60> 6/13/2000 GLAUCOMA SCREENING Q2Y 8/13/2005 Bone Densitometry (Dexa) Screening 8/13/2005 Pneumococcal 65+ Low/Medium Risk (1 of 2 - PCV13) 8/13/2005 MEDICARE YEARLY EXAM 3/14/2018 Influenza Age 5 to Adult 8/1/2018 Allergies as of 6/8/2018  Review Complete On: 6/8/2018 By: Boy Anderson LPN Severity Noted Reaction Type Reactions Demerol [Meperidine]  11/09/2010    Nausea and Vomiting Heparinoids  03/19/2014    Other (comments) Has a +Hit Ab. Ivp Dye [Fd And C Blue No.1]  11/09/2010    Rash Percocet [Oxycodone-acetaminophen]  03/11/2014    Nausea Only Sulfa (Sulfonamide Antibiotics)  11/09/2010    Rash Current Immunizations  Never Reviewed No immunizations on file. Not reviewed this visit Vitals BP Pulse Resp Height(growth percentile) Weight(growth percentile) SpO2  
 126/50 (BP 1 Location: Left arm, BP Patient Position: Sitting) 74 18 5' 4\" (1.626 m) 170 lb (77.1 kg) 98% BMI OB Status Smoking Status 29.18 kg/m2 Postmenopausal Never Smoker BMI and BSA Data Body Mass Index Body Surface Area  
 29.18 kg/m 2 1.87 m 2 Preferred Pharmacy Pharmacy Name Phone [facility] [facility], [address] [place] [address] Drive [phone] Your Updated Medication List  
  
   
This list is accurate as of 6/8/18  2:16 PM.  Always use your most recent med list.  
  
  
  
  
 aspirin delayed-release 81 mg tablet Take  by mouth daily. baclofen 10 mg tablet Commonly known as:  LIORESAL Take 1 Tab by mouth every eight (8) hours as needed (spasms). COLACE 100 mg capsule Generic drug:  docusate sodium Take 100 mg by mouth two (2) times a day. DULoxetine 60 mg capsule Commonly known as:  CYMBALTA Take 60 mg by mouth daily. fenofibric acid 135 mg capsule Commonly known as:  TRILIPIX Take 1 Cap by mouth daily. furosemide 40 mg tablet Commonly known as:  LASIX Takes 1/2 tablet twice daily  
  
 gabapentin 300 mg capsule Commonly known as:  NEURONTIN Take 300 mg by mouth two (2) times daily as needed. HYDROcodone-acetaminophen 5-325 mg per tablet Commonly known as:  Skinny Quirk Take 0.5-1 Tabs by mouth every six (6) hours as needed for Pain.  
  
 metFORMIN 500 mg tablet Commonly known as:  GLUCOPHAGE Take  by mouth two (2) times daily (with meals). NUPLAZID 17 mg Tab Generic drug:  pimavanserin Take 17 mg by mouth nightly. oxybutynin 5 mg tablet Commonly known as:  UXXMLZXS Take 5 mg by mouth daily as needed. PriLOSEC 40 mg capsule Generic drug:  omeprazole Take 40 mg by mouth daily. PROBIOTIC PO Take  by mouth three (3) times daily as needed. rivastigmine 4.6 mg/24 hr patch Commonly known as:  EXELON  
1 Patch by TransDERmal route daily. Senna 8.6 mg Cap Generic drug:  sennosides Take  by mouth two (2) times daily as needed. SINEMET CR  mg per tablet Generic drug:  carbidopa-levodopa ER Take 1 Tab by mouth three (3) times daily. TOPROL XL 25 mg XL tablet Generic drug:  metoprolol succinate Take  by mouth daily. Introducing [facility]! Dear Chris: Thank you for requesting a [employer] account. Our records indicate that you already have an active [employer] account. You can access your account anytime at [email or web address] Did you know that you can access your hospital and ER discharge instructions at any time in [employer]? You can also review all of your test results from your hospital stay or ER visit. Additional Information If you have questions, please visit the Frequently Asked Questions section of the [employer] website at [email or web address] Remember, [employer] is NOT to be used for urgent needs. For medical emergencies, dial 911. Now available from your iPhone and Android! Please provide this summary of care documentation to your next provider. Your primary care clinician is listed as Kelly Rodriguez III. If you have any questions after today's visit, please call [phone].

## 2018-06-08 NOTE — PROGRESS NOTES
All health maintenance and other pertinent information has been reviewed in preparation for today's office visit. Patient presents in the office today for:    Chief Complaint   Patient presents with    Follow-up     Paroxysmal atrial fibrillation ([facility] [address]       1. Have you been to the ER, urgent care clinic since your last visit? Hospitalized since your last visit? No    2. Have you seen or consulted any other health care providers outside of the [address] [place] [address] since your last visit? Include any pap smears or colon screening.  No    Visit Vitals    /50 (BP 1 Location: Left arm, BP Patient Position: Sitting)    Pulse 74    Resp 18    Ht 5' 4\" (1.626 m)    Wt 170 lb (77.1 kg)    SpO2 98%    BMI 29.18 kg/m2